# Patient Record
Sex: MALE | Race: WHITE | NOT HISPANIC OR LATINO | Employment: FULL TIME | URBAN - METROPOLITAN AREA
[De-identification: names, ages, dates, MRNs, and addresses within clinical notes are randomized per-mention and may not be internally consistent; named-entity substitution may affect disease eponyms.]

---

## 2019-11-30 ENCOUNTER — HOSPITAL ENCOUNTER (EMERGENCY)
Facility: HOSPITAL | Age: 39
Discharge: HOME | End: 2019-11-30
Attending: EMERGENCY MEDICINE

## 2019-11-30 VITALS
TEMPERATURE: 98 F | OXYGEN SATURATION: 99 % | RESPIRATION RATE: 20 BRPM | DIASTOLIC BLOOD PRESSURE: 73 MMHG | HEIGHT: 70 IN | HEART RATE: 106 BPM | SYSTOLIC BLOOD PRESSURE: 148 MMHG | WEIGHT: 224 LBS | BODY MASS INDEX: 32.07 KG/M2

## 2019-11-30 DIAGNOSIS — K92.0 HEMATEMESIS, PRESENCE OF NAUSEA NOT SPECIFIED: ICD-10-CM

## 2019-11-30 DIAGNOSIS — R51.9 NONINTRACTABLE HEADACHE, UNSPECIFIED CHRONICITY PATTERN, UNSPECIFIED HEADACHE TYPE: Primary | ICD-10-CM

## 2019-11-30 PROCEDURE — 99284 EMERGENCY DEPT VISIT MOD MDM: CPT | Mod: 25

## 2019-11-30 PROCEDURE — 63600175 PHARM REV CODE 636 W HCPCS: Performed by: EMERGENCY MEDICINE

## 2019-11-30 PROCEDURE — 96365 THER/PROPH/DIAG IV INF INIT: CPT

## 2019-11-30 PROCEDURE — 96375 TX/PRO/DX INJ NEW DRUG ADDON: CPT

## 2019-11-30 RX ORDER — ONDANSETRON 4 MG/1
4 TABLET, ORALLY DISINTEGRATING ORAL EVERY 8 HOURS PRN
Qty: 12 TABLET | Refills: 0 | Status: SHIPPED | OUTPATIENT
Start: 2019-11-30

## 2019-11-30 RX ORDER — DIPHENHYDRAMINE HYDROCHLORIDE 50 MG/ML
12.5 INJECTION INTRAMUSCULAR; INTRAVENOUS
Status: COMPLETED | OUTPATIENT
Start: 2019-11-30 | End: 2019-11-30

## 2019-11-30 RX ADMIN — PROMETHAZINE HYDROCHLORIDE 25 MG: 25 INJECTION INTRAMUSCULAR; INTRAVENOUS at 10:11

## 2019-11-30 RX ADMIN — DIPHENHYDRAMINE HYDROCHLORIDE 12.5 MG: 50 INJECTION INTRAMUSCULAR; INTRAVENOUS at 10:11

## 2019-12-01 NOTE — ED PROVIDER NOTES
Encounter Date: 11/30/2019    SCRIBE #1 NOTE: I, Harry Perez, am scribing for, and in the presence of, Dany Mcgregor MD.       History     Chief Complaint   Patient presents with    Headache     Vomiting started this PM,        Time seen by provider: 10:06 PM on 11/30/2019    Jeramie Hamilton is a 38 y.o. male who presents to the ED with an onset of nausea with a single episode of vomiting with small amount dark blood occurring PTA. This is preceded by 16 hrs of intermittent worsening left upper forehead HA and pressure/pain to the left eye that is unchanged with Ibuprofen for 16 hrs. He states that he awoke with this pain and it does not affect his vision. This pain also worsens with light. Pt  The blood in his vomit was approximately the size of a nickel and occurred only wants. He was taking Atenolol and Methimazole but since he moved from [place] he has not been on medication.  Smokes half pack a day.  PMHx of HTN and to heart attacks. No PSHx of cardiac stent placement. SHx of smoking 0.5 PPD, current. FHx of early dead by cardiac arrest, grandfather at 42. Known reaction to sulfa.  Patient has a long history of similar headaches.  This headache is not different in any way.    The history is provided by the patient.     Review of patient's allergies indicates:   Allergen Reactions    Sulfa (sulfonamide antibiotics)      No past medical history on file.  No past surgical history on file.  No family history on file.  Social History     Tobacco Use    Smoking status: Not on file   Substance Use Topics    Alcohol use: Not on file    Drug use: Not on file     Review of Systems   Constitutional: Negative for fever.   HENT: Negative for sore throat.    Eyes: Positive for photophobia and pain. Negative for visual disturbance.   Respiratory: Negative for shortness of breath.    Cardiovascular: Negative for chest pain.   Gastrointestinal: Positive for nausea and vomiting. Negative for abdominal pain and diarrhea.    Musculoskeletal: Negative for back pain.   Skin: Negative for rash.   Neurological: Negative for dizziness, speech difficulty, weakness and numbness.       Physical Exam     Initial Vitals [11/30/19 2031]   BP Pulse Resp Temp SpO2   (!) 154/80 (!) 122 20 98.1 °F (36.7 °C) 97 %      MAP       --         Physical Exam    Nursing note and vitals reviewed.  Constitutional: He appears well-developed and well-nourished. He is not diaphoretic.  Non-toxic appearance. He does not have a sickly appearance. He does not appear ill. No distress.   No distress well-appearing   HENT:   Head: Normocephalic and atraumatic.   Eyes: EOM are normal. Pupils are equal, round, and reactive to light. Right eye exhibits no nystagmus. Left eye exhibits no nystagmus.   Positive mild photophobia on examination.   Neck: Normal range of motion. Neck supple. Normal range of motion present. No neck rigidity.   Cardiovascular: Normal rate, regular rhythm and normal heart sounds. Exam reveals no gallop and no friction rub.    No murmur heard.  Pulmonary/Chest: Breath sounds normal. No respiratory distress. He has no wheezes. He has no rhonchi. He has no rales.   Abdominal: Normal appearance. There is no tenderness.   Musculoskeletal: Normal range of motion.   Neurological: He is alert and oriented to person, place, and time. He has normal strength. No cranial nerve deficit or sensory deficit. He exhibits normal muscle tone. Coordination and gait normal.    Normal finger to nose. No nystagmus. No truncal ataxia. No past pointing. Normal gait.     Skin: Skin is warm and dry. No rash noted.   Psychiatric: He has a normal mood and affect. His behavior is normal. Judgment and thought content normal.         ED Course   Procedures  Labs Reviewed - No data to display       Imaging Results    None          Medical Decision Making:   History:   Old Medical Records: I decided to obtain old medical records.            Scribe Attestation:   Scribe #1: I  performed the above scribed service and the documentation accurately describes the services I performed. I attest to the accuracy of the note.    I, Dr. Mcgregor, personally performed the services described in this documentation. All medical record entries made by the scribe were at my direction and in my presence.  I have reviewed the chart and agree that the record reflects my personal performance and is accurate and complete.12:25 AM 12/01/2019            ED Course as of Nov 30 2332   Sat Nov 30, 2019 2159 BP(!): 154/80 [EF]   2159 Temp: 98.1 °F (36.7 °C) [EF]   2159 Temp src: Oral [EF]   2159 Pulse(!): 122 [EF]   2159 Resp: 20 [EF]   2159 SpO2: 97 % [EF]   2331 Headache and nausea now resolved patient will be discharged home    [EF]      ED Course User Index  [EF] Dany Mcgregor MD                Clinical Impression:       ICD-10-CM ICD-9-CM   1. Nonintractable headache, unspecified chronicity pattern, unspecified headache type R51 784.0   2. Hematemesis, presence of nausea not specified K92.0 578.0         Disposition:   Disposition: Discharged  Condition: Stable        38-year-old with a long history of headaches presents with a headache since this morning consistent with all prior headaches.  Sounds convincing for migraine left-sided similar to prior associated with nausea and vomiting.  Patient noticed a nickel sized blood clot at 1 point after vomiting. This occurred only 1 time.  He might have a very minimal esophageal tear.  I do not think the patient needs to be admitted for monitoring or GI consultation or for an emergent EGD.  IV Phenergan and Benadryl here in the emergency room his completely eliminated his headache and his nausea.  He is referred to headache specialist.  Return to the ER for any further hematemesis.  No indication for head CT or lumbar puncture.             Dany Mcgregor MD  12/01/19 0026

## 2020-03-13 ENCOUNTER — HOSPITAL ENCOUNTER (EMERGENCY)
Facility: HOSPITAL | Age: 40
Discharge: HOME | End: 2020-03-13
Attending: EMERGENCY MEDICINE

## 2020-03-13 VITALS
WEIGHT: 225 LBS | BODY MASS INDEX: 32.28 KG/M2 | RESPIRATION RATE: 16 BRPM | SYSTOLIC BLOOD PRESSURE: 159 MMHG | OXYGEN SATURATION: 99 % | DIASTOLIC BLOOD PRESSURE: 87 MMHG | TEMPERATURE: 100 F | HEART RATE: 95 BPM

## 2020-03-13 DIAGNOSIS — B34.9 VIRAL SYNDROME: Primary | ICD-10-CM

## 2020-03-13 LAB
INFLUENZA A, MOLECULAR: NEGATIVE
INFLUENZA B, MOLECULAR: NEGATIVE
SPECIMEN SOURCE: NORMAL

## 2020-03-13 PROCEDURE — 99283 EMERGENCY DEPT VISIT LOW MDM: CPT

## 2020-03-13 PROCEDURE — 25000003 PHARM REV CODE 250: Performed by: EMERGENCY MEDICINE

## 2020-03-13 PROCEDURE — 87502 INFLUENZA DNA AMP PROBE: CPT

## 2020-03-13 RX ORDER — ACETAMINOPHEN 325 MG/1
650 TABLET ORAL
Status: COMPLETED | OUTPATIENT
Start: 2020-03-13 | End: 2020-03-13

## 2020-03-13 RX ADMIN — ACETAMINOPHEN 650 MG: 325 TABLET ORAL at 10:03

## 2020-03-13 NOTE — ED PROVIDER NOTES
Encounter Date: 3/13/2020    SCRIBE #1 NOTE: I, Day Alfaro, am scribing for, and in the presence of, Dr. Curtis.       History     Chief Complaint   Patient presents with    Generalized Body Aches    Emesis       Time seen by provider: 10:01 AM on 03/13/2020    Jeramie Hamilton is a 39 y.o. male who presents to the ED with c/o nausea and vomiting since yesterday. He states his last episode of vomiting was 5 hours ago. He states he has been able to tolerate liquids and food since. He notes associated low grade fever and body aches. No abdominal pain, runny nose, CP or SOB. He has no other complaints. No PMHx or PSHx. NKDA.     The history is provided by the patient.     Review of patient's allergies indicates:   Allergen Reactions    Sulfa (sulfonamide antibiotics)      No past medical history on file.  No past surgical history on file.  No family history on file.  Social History     Tobacco Use    Smoking status: Not on file   Substance Use Topics    Alcohol use: Not on file    Drug use: Not on file     Review of Systems   Constitutional: Positive for fever (subjective).   HENT: Negative for rhinorrhea.    Respiratory: Negative for shortness of breath.    Cardiovascular: Negative for chest pain.   Gastrointestinal: Positive for nausea and vomiting. Negative for abdominal pain.   Genitourinary: Negative for flank pain.   Musculoskeletal: Positive for myalgias (generalized). Negative for gait problem.   Neurological: Negative for weakness.   Psychiatric/Behavioral: Negative for confusion.       Physical Exam     Initial Vitals [03/13/20 0946]   BP Pulse Resp Temp SpO2   (!) 177/103 (!) 129 18 99.9 °F (37.7 °C) 98 %      MAP       --         Physical Exam    Nursing note and vitals reviewed.  Constitutional: He appears well-developed and well-nourished.   HENT:   Head: Normocephalic and atraumatic.   Eyes: Conjunctivae are normal.   Neck: Normal range of motion. Neck supple.   Cardiovascular: Regular rhythm and  normal heart sounds. Tachycardia present.  Exam reveals no gallop and no friction rub.    No murmur heard.  Pulmonary/Chest: Breath sounds normal. No respiratory distress. He has no wheezes. He has no rhonchi. He has no rales.   Abdominal: Soft. He exhibits no distension. There is no tenderness.   Musculoskeletal: Normal range of motion.   Neurological: He is alert and oriented to person, place, and time.   Skin: Skin is warm and dry.   Psychiatric: He has a normal mood and affect.         ED Course   Procedures  Labs Reviewed   INFLUENZA A & B BY MOLECULAR          Imaging Results    None          Medical Decision Making:   History:   Old Medical Records: I decided to obtain old medical records.  Clinical Tests:   Lab Tests: Ordered and Reviewed  ED Management:  39-year-old male presents with myalgias and a single episode of emesis.  He has had no fever cough or congestion.  The symptoms are concerning for possible Covid 19 in the context of a pandemic; however, current guidelines do not recommend testing.  Influenza is negative.       APC / Resident Notes:   I, Dr. Sadiq Curtis III, personally performed the services described in this documentation. All medical record entries made by the scribe were at my direction and in my presence.  I have reviewed the chart and agree that the record reflects my personal performance and is accurate and complete       Scribe Attestation:   Scribe #1: I performed the above scribed service and the documentation accurately describes the services I performed. I attest to the accuracy of the note.                          Clinical Impression:       ICD-10-CM ICD-9-CM   1. Viral syndrome B34.9 079.99         Disposition:   Disposition: Discharged  Condition: Stable                        Sadiq Curtis III, MD  03/13/20 1306

## 2020-04-22 DIAGNOSIS — Z86.39 PERSONAL HISTORY OF OTHER ENDOCRINE, NUTRITIONAL AND METABOLIC DISEASE: Primary | ICD-10-CM

## 2020-07-25 ENCOUNTER — HOSPITAL ENCOUNTER (EMERGENCY)
Facility: HOSPITAL | Age: 40
Discharge: HOME | End: 2020-07-26
Attending: EMERGENCY MEDICINE

## 2020-07-25 VITALS
HEART RATE: 95 BPM | RESPIRATION RATE: 24 BRPM | WEIGHT: 209 LBS | OXYGEN SATURATION: 99 % | TEMPERATURE: 98 F | DIASTOLIC BLOOD PRESSURE: 77 MMHG | HEIGHT: 70 IN | SYSTOLIC BLOOD PRESSURE: 152 MMHG | BODY MASS INDEX: 29.92 KG/M2

## 2020-07-25 DIAGNOSIS — N20.1 URETEROLITHIASIS: Primary | ICD-10-CM

## 2020-07-25 DIAGNOSIS — N20.0 KIDNEY STONE: ICD-10-CM

## 2020-07-25 DIAGNOSIS — R10.9 ABDOMINAL PAIN: ICD-10-CM

## 2020-07-25 LAB
ALBUMIN SERPL BCP-MCNC: 4 G/DL (ref 3.5–5.2)
ALP SERPL-CCNC: 378 U/L (ref 55–135)
ALT SERPL W/O P-5'-P-CCNC: 26 U/L (ref 10–44)
ANION GAP SERPL CALC-SCNC: 12 MMOL/L (ref 8–16)
APTT BLDCRRT: 29.4 SEC (ref 21–32)
AST SERPL-CCNC: 20 U/L (ref 10–40)
BACTERIA #/AREA URNS HPF: ABNORMAL /HPF
BASOPHILS # BLD AUTO: 0.03 K/UL (ref 0–0.2)
BASOPHILS NFR BLD: 0.3 % (ref 0–1.9)
BILIRUB SERPL-MCNC: 0.4 MG/DL (ref 0.1–1)
BILIRUB UR QL STRIP: ABNORMAL
BUN SERPL-MCNC: 15 MG/DL (ref 6–20)
CALCIUM SERPL-MCNC: 9.2 MG/DL (ref 8.7–10.5)
CHLORIDE SERPL-SCNC: 107 MMOL/L (ref 95–110)
CLARITY UR: ABNORMAL
CO2 SERPL-SCNC: 21 MMOL/L (ref 23–29)
COLOR UR: ABNORMAL
CREAT SERPL-MCNC: 0.9 MG/DL (ref 0.5–1.4)
DIFFERENTIAL METHOD: ABNORMAL
EOSINOPHIL # BLD AUTO: 0.2 K/UL (ref 0–0.5)
EOSINOPHIL NFR BLD: 2.4 % (ref 0–8)
ERYTHROCYTE [DISTWIDTH] IN BLOOD BY AUTOMATED COUNT: 13.8 % (ref 11.5–14.5)
EST. GFR  (AFRICAN AMERICAN): >60 ML/MIN/1.73 M^2
EST. GFR  (NON AFRICAN AMERICAN): >60 ML/MIN/1.73 M^2
GLUCOSE SERPL-MCNC: 108 MG/DL (ref 70–110)
GLUCOSE UR QL STRIP: NEGATIVE
HCT VFR BLD AUTO: 43.4 % (ref 40–54)
HGB BLD-MCNC: 13.9 G/DL (ref 14–18)
HGB UR QL STRIP: ABNORMAL
HYALINE CASTS #/AREA URNS LPF: 0 /LPF
IMM GRANULOCYTES # BLD AUTO: 0.02 K/UL (ref 0–0.04)
IMM GRANULOCYTES NFR BLD AUTO: 0.2 % (ref 0–0.5)
INR PPP: 0.9 (ref 0.8–1.2)
KETONES UR QL STRIP: NEGATIVE
LEUKOCYTE ESTERASE UR QL STRIP: NEGATIVE
LIPASE SERPL-CCNC: 22 U/L (ref 4–60)
LYMPHOCYTES # BLD AUTO: 4.2 K/UL (ref 1–4.8)
LYMPHOCYTES NFR BLD: 42.1 % (ref 18–48)
MCH RBC QN AUTO: 26.8 PG (ref 27–31)
MCHC RBC AUTO-ENTMCNC: 32 G/DL (ref 32–36)
MCV RBC AUTO: 84 FL (ref 82–98)
MICROSCOPIC COMMENT: ABNORMAL
MONOCYTES # BLD AUTO: 1.1 K/UL (ref 0.3–1)
MONOCYTES NFR BLD: 11.2 % (ref 4–15)
NEUTROPHILS # BLD AUTO: 4.4 K/UL (ref 1.8–7.7)
NEUTROPHILS NFR BLD: 43.8 % (ref 38–73)
NITRITE UR QL STRIP: NEGATIVE
NRBC BLD-RTO: 0 /100 WBC
PH UR STRIP: 6 [PH] (ref 5–8)
PLATELET # BLD AUTO: 238 K/UL (ref 150–350)
PMV BLD AUTO: 9.5 FL (ref 9.2–12.9)
POTASSIUM SERPL-SCNC: 3.8 MMOL/L (ref 3.5–5.1)
PROT SERPL-MCNC: 7.2 G/DL (ref 6–8.4)
PROT UR QL STRIP: ABNORMAL
PROTHROMBIN TIME: 10.5 SEC (ref 9–12.5)
RBC # BLD AUTO: 5.19 M/UL (ref 4.6–6.2)
RBC #/AREA URNS HPF: 75 /HPF (ref 0–4)
SODIUM SERPL-SCNC: 140 MMOL/L (ref 136–145)
SP GR UR STRIP: >=1.03 (ref 1–1.03)
TROPONIN I SERPL DL<=0.01 NG/ML-MCNC: 0.01 NG/ML (ref 0–0.03)
URN SPEC COLLECT METH UR: ABNORMAL
UROBILINOGEN UR STRIP-ACNC: 1 EU/DL
WBC # BLD AUTO: 9.94 K/UL (ref 3.9–12.7)
WBC #/AREA URNS HPF: 1 /HPF (ref 0–5)

## 2020-07-25 PROCEDURE — 83690 ASSAY OF LIPASE: CPT

## 2020-07-25 PROCEDURE — 25000003 PHARM REV CODE 250: Performed by: EMERGENCY MEDICINE

## 2020-07-25 PROCEDURE — 63600175 PHARM REV CODE 636 W HCPCS: Performed by: EMERGENCY MEDICINE

## 2020-07-25 PROCEDURE — 85730 THROMBOPLASTIN TIME PARTIAL: CPT

## 2020-07-25 PROCEDURE — 93005 ELECTROCARDIOGRAM TRACING: CPT

## 2020-07-25 PROCEDURE — 96375 TX/PRO/DX INJ NEW DRUG ADDON: CPT

## 2020-07-25 PROCEDURE — 85610 PROTHROMBIN TIME: CPT

## 2020-07-25 PROCEDURE — 80053 COMPREHEN METABOLIC PANEL: CPT

## 2020-07-25 PROCEDURE — 85025 COMPLETE CBC W/AUTO DIFF WBC: CPT

## 2020-07-25 PROCEDURE — 36415 COLL VENOUS BLD VENIPUNCTURE: CPT

## 2020-07-25 PROCEDURE — 96374 THER/PROPH/DIAG INJ IV PUSH: CPT

## 2020-07-25 PROCEDURE — 81000 URINALYSIS NONAUTO W/SCOPE: CPT

## 2020-07-25 PROCEDURE — 99285 EMERGENCY DEPT VISIT HI MDM: CPT | Mod: 25

## 2020-07-25 PROCEDURE — 84484 ASSAY OF TROPONIN QUANT: CPT

## 2020-07-25 PROCEDURE — 25500020 PHARM REV CODE 255

## 2020-07-25 RX ORDER — ONDANSETRON 2 MG/ML
8 INJECTION INTRAMUSCULAR; INTRAVENOUS
Status: COMPLETED | OUTPATIENT
Start: 2020-07-25 | End: 2020-07-25

## 2020-07-25 RX ORDER — METHIMAZOLE 10 MG/1
5 TABLET ORAL 2 TIMES DAILY
COMMUNITY

## 2020-07-25 RX ORDER — PROPRANOLOL HYDROCHLORIDE 20 MG/1
20 TABLET ORAL 3 TIMES DAILY
COMMUNITY

## 2020-07-25 RX ORDER — TAMSULOSIN HYDROCHLORIDE 0.4 MG/1
0.4 CAPSULE ORAL DAILY
Qty: 10 CAPSULE | Refills: 0 | Status: SHIPPED | OUTPATIENT
Start: 2020-07-25 | End: 2020-08-04

## 2020-07-25 RX ORDER — ONDANSETRON 4 MG/1
4 TABLET, FILM COATED ORAL EVERY 6 HOURS
Qty: 12 TABLET | Refills: 0 | Status: SHIPPED | OUTPATIENT
Start: 2020-07-25

## 2020-07-25 RX ORDER — ASPIRIN 81 MG/1
81 TABLET ORAL DAILY
COMMUNITY

## 2020-07-25 RX ORDER — HYDROCODONE BITARTRATE AND ACETAMINOPHEN 5; 325 MG/1; MG/1
1 TABLET ORAL EVERY 4 HOURS PRN
Qty: 10 TABLET | Refills: 0 | Status: SHIPPED | OUTPATIENT
Start: 2020-07-25

## 2020-07-25 RX ORDER — KETOROLAC TROMETHAMINE 30 MG/ML
15 INJECTION, SOLUTION INTRAMUSCULAR; INTRAVENOUS
Status: COMPLETED | OUTPATIENT
Start: 2020-07-25 | End: 2020-07-25

## 2020-07-25 RX ORDER — MORPHINE SULFATE 2 MG/ML
6 INJECTION, SOLUTION INTRAMUSCULAR; INTRAVENOUS
Status: COMPLETED | OUTPATIENT
Start: 2020-07-25 | End: 2020-07-25

## 2020-07-25 RX ADMIN — SODIUM CHLORIDE 1000 ML: 0.9 INJECTION, SOLUTION INTRAVENOUS at 09:07

## 2020-07-25 RX ADMIN — ONDANSETRON 8 MG: 2 INJECTION INTRAMUSCULAR; INTRAVENOUS at 09:07

## 2020-07-25 RX ADMIN — MORPHINE SULFATE 6 MG: 2 INJECTION, SOLUTION INTRAMUSCULAR; INTRAVENOUS at 09:07

## 2020-07-25 RX ADMIN — KETOROLAC TROMETHAMINE 15 MG: 30 INJECTION, SOLUTION INTRAMUSCULAR at 11:07

## 2020-07-25 RX ADMIN — IOHEXOL 100 ML: 350 INJECTION, SOLUTION INTRAVENOUS at 10:07

## 2020-07-26 NOTE — ED NOTES
Patient reports that he is still having pain to right lower abdomen that he rates 6/10. Patient appears to be more comfortable and is no longer diaphoretic.

## 2020-07-26 NOTE — ED NOTES
"Jeramie Hamilton presents to the ED with c/o right lower back pain that started 2 hours ago and is radiating to right lower abdomen. Patient reports that pain feels as if he has a kidney stone. Patient had a non ST elevated MI one week ago. Patient is severely diaphoretic. + for nausea. Denies any vomiting or diarrhea. "I did see some blood in my urine either." Patient gave approximately 20 ml of medium colored brown urine. AAOx4. Patient appears to be very uncomfortable. Patient states that sitting up makes pain better. Patient is guarding right lower abdomen. Mucous membranes are pink and moist. Skin is warm, dry and intact. Lungs are clear bilaterally, respirations are regular and unlabored. Denies SOB, cough, congestion or rhinorrhea. BS active x4, tenderness to RLQ  with palpation, abd is soft and not distended. Denies any appetite or activity change. S1S2, capillary refill is < 2 seconds. Denies dysuria, difficulty urinating, frequency, numbness, tingling or weakness. MILDRED MARRUFOS    "

## 2020-07-26 NOTE — ED PROVIDER NOTES
Encounter Date: 7/25/2020    SCRIBE #1 NOTE: I, Jose Diaz, am scribing for, and in the presence of, Edouard Amezcua MD.       History     Chief Complaint   Patient presents with    Abdominal Pain     Started as lower back pain, but now could be kidney stones; radiated to RLQ ABD pain       Time seen by provider: 9:51 PM on 07/25/2020    Jeramie Hamilton is a 39 y.o. male with PMHx of NSTEMI, HTN, hyperthyroidism who presents to the ED with an onset of right lower abdominal pain beginning today. Associated symptoms include sweating and hematuria. Pain began in R flank now in RLQ. The patient endorses having a kidney stone last year. The patient denies fever, chest pain, SOB, chills, diarrhea, constipation, dysuria, or any other symptoms at this time. No PSHx.      The history is provided by the patient.     Review of patient's allergies indicates:   Allergen Reactions    Sulfa (sulfonamide antibiotics)      Past Medical History:   Diagnosis Date    HTN (hypertension)     Hyperthyroidism     NSTEMI (non-ST elevated myocardial infarction)      History reviewed. No pertinent surgical history.  History reviewed. No pertinent family history.  Social History     Tobacco Use    Smoking status: Current Every Day Smoker     Packs/day: 0.50     Years: 15.00     Pack years: 7.50     Types: Cigarettes    Smokeless tobacco: Never Used   Substance Use Topics    Alcohol use: Not on file    Drug use: Not on file     Review of Systems   Constitutional: Negative for activity change, diaphoresis and fever.   HENT: Negative for drooling, rhinorrhea, sore throat and trouble swallowing.    Eyes: Negative for pain and visual disturbance.   Respiratory: Negative for cough, shortness of breath and stridor.    Cardiovascular: Negative for chest pain and leg swelling.   Gastrointestinal: Positive for abdominal pain. Negative for abdominal distention, constipation, diarrhea and vomiting.   Genitourinary: Positive for hematuria. Negative  for discharge and dysuria.   Musculoskeletal: Negative for gait problem.   Skin: Negative for rash.   Neurological: Negative for seizures, facial asymmetry and headaches.   Psychiatric/Behavioral: Negative for hallucinations and suicidal ideas.       Physical Exam     Initial Vitals   BP Pulse Resp Temp SpO2   07/25/20 2131 07/25/20 2130 07/25/20 2130 07/25/20 2130 07/25/20 2130   (!) 177/101 82 (!) 22 98.2 °F (36.8 °C) 100 %      MAP       --                Physical Exam    Nursing note and vitals reviewed.  Constitutional: He appears well-developed. No distress.   Appears uncomfortable. Diaphoretic.    HENT:   Head: Normocephalic and atraumatic.   Nose: Nose normal.   Eyes: EOM are normal.   Neck: Neck supple. No tracheal deviation present. No JVD present.   Cardiovascular: Normal rate, regular rhythm, normal heart sounds and intact distal pulses. Exam reveals no gallop and no friction rub.    No murmur heard.  Pulmonary/Chest: Breath sounds normal. No respiratory distress. He has no wheezes. He has no rhonchi. He has no rales.   Abdominal: Soft. Bowel sounds are normal. There is abdominal tenderness in the right lower quadrant. There is guarding. There is no rigidity, no rebound and no CVA tenderness.   No CVA tenderness. RLQ tenderness. Voluntary guarding. No rigidity. No rebound.    Musculoskeletal: Normal range of motion.   Neurological: He is alert and oriented to person, place, and time. No cranial nerve deficit.   Skin: Skin is warm and dry. Capillary refill takes less than 2 seconds. No rash noted.   Psychiatric: He has a normal mood and affect.         ED Course   Procedures  Labs Reviewed   COMPREHENSIVE METABOLIC PANEL - Abnormal; Notable for the following components:       Result Value    CO2 21 (*)     Alkaline Phosphatase 378 (*)     All other components within normal limits   CBC W/ AUTO DIFFERENTIAL - Abnormal; Notable for the following components:    Hemoglobin 13.9 (*)     Mean Corpuscular  Hemoglobin 26.8 (*)     Mono # 1.1 (*)     All other components within normal limits   URINALYSIS - Abnormal; Notable for the following components:    Color, UA Brown (*)     Appearance, UA Hazy (*)     Specific Gravity, UA >=1.030 (*)     Protein, UA 2+ (*)     Bilirubin (UA) 2+ (*)     Occult Blood UA 3+ (*)     All other components within normal limits   URINALYSIS MICROSCOPIC - Abnormal; Notable for the following components:    RBC, UA 75 (*)     All other components within normal limits   TROPONIN I   APTT   PROTIME-INR   LIPASE     EKG Readings: (Independently Interpreted)   Initial Reading: No STEMI. Heart Rate: 78.   Sinus rhythm of 78 bpm with short IN. No STEMI.       Imaging Results           CT Abdomen Pelvis With Contrast (Final result)  Result time 07/25/20 22:55:02    Final result by Juan Miguel Chand MD (07/25/20 22:55:02)                 Impression:      Nonvisualization of the appendix.  No CT findings of acute appendicitis.    3 mm obstructing stone in the right proximal with associated mild right-sided hydroureteronephrosis.  No additional nephrolithiasis.    Hazy changes involving the small bowel mesentery with multiple subcentimeter lymph nodes.  The appearance suggestive of wai mesentery.  Follow-up to resolution is suggested to exclude underlying process such as lymphoma.    L4-L5 spondylolisthesis.    Additional findings as above    This report was flagged in Epic as abnormal.      Electronically signed by: Juan Miguel Chand MD  Date:    07/25/2020  Time:    22:55             Narrative:    EXAMINATION:  CT ABDOMEN PELVIS WITH CONTRAST    CLINICAL HISTORY:  Abdominal infection suspected;RLQ abdominal pain, appendicitis suspected (Age => 14y);    TECHNIQUE:  Low dose axial images, sagittal and coronal reformations were obtained from the lung bases to the pubic symphysis following the IV administration of 100 mL of Omnipaque 350 .  Oral contrast was not given.    COMPARISON:  None.    FINDINGS:  There  are no pleural effusions.  There is no evidence of a pneumothorax.  No airspace opacity is present.  No pulmonary nodule is identified.    The heart is unremarkable.  There is normal tapering of the abdominal aorta.  There are scattered calcifications involving the infrarenal abdominal aorta.  The celiac trunk, SMA, and GLORIA are within normal limits.  The portal veins and mesenteric vessels are unremarkable.  The IVC and the remainder of the venous structures are within normal limits.    There are innumerable subcentimeter lymph nodes throughout the small bowel mesentery.  There is hazy appearance of the small bowel mesentery.  There also subcentimeter bilateral retroperitoneal lymph nodes.    The esophagus, stomach, and duodenum are within normal limits.  The small bowel loops are unremarkable.  The appendix is not visualized.  There are no secondary findings of acute appendicitis.  There is colonic diverticula without evidence of acute diverticulitis.  There is no evidence of bowel obstruction.    The liver is unremarkable.  The gallbladder is within normal limits.  The biliary tree is within normal limits.  The spleen is unremarkable.  The pancreas is within normal limits.    The adrenal glands are unremarkable.  There is a 1.8 cm simple cyst in the upper pole of the right kidney.  The left kidney is unremarkable.  There is a 3 mm obstructing stone in the right proximal ureter.  There is mild right-sided hydroureteronephrosis.  The remainder of the ureters are unremarkable.  The urinary bladder is within normal limits.  The prostate gland is enlarged.  There are calcifications in the prostate gland.    There is no evidence of free fluid in the abdomen or pelvis.  There is no evidence of free air.  There is no evidence of pneumatosis.  No portal venous air is identified.    The psoas margins are unremarkable.  There are bilateral fat containing inguinal hernias.  The remainder of the abdominal wall is  unremarkable.    There is transitional lumbosacral anatomy with partial lumbarization of S1 vertebral body.  There is grade 1 anterolisthesis of L4 on L5.  There are bilateral pars defects at the L4 level.                                 Medical Decision Making:   History:   Old Medical Records: I decided to obtain old medical records.  Independently Interpreted Test(s):   I have ordered and independently interpreted EKG Reading(s) - see prior notes  Clinical Tests:   Lab Tests: Ordered and Reviewed  Radiological Study: Reviewed and Ordered  Medical Tests: Ordered and Reviewed  ED Management:  40 yo M pmhx of kidney stones presents with flank pain consistent with renal colic for the first time.  Patient otherwise well-appearing with low suspicion for sepsis, dissection or infected kidney stone. Low suspicion for atypical appendicitis, torsion, acute cholecystitis, or intraabdominal infection. Low suspicion for ITALO, obstructive nephropathy given exam and history.    Will obtain CT noncontrast, UA, reassess.    CT showed 3mm stone in the right proximal ureter. No evidence of infection on UA. Patient tolerating PO and pain controlled prior to discharge. Strict return precautions for infected stone or po intolerance discussed. Given strainer and flomax and referral to urology. Pt understands and agrees with discharge instructions. Pt also given strict return precautions for any new or worsening symptoms and plans to follow up closely with Urology.               Scribe Attestation:   Scribe #1: I performed the above scribed service and the documentation accurately describes the services I performed. I attest to the accuracy of the note.      Attending Attestation:     Physician Attestation for Scribe:    I, Dr. Edouard Amezcua, personally performed the services described in this documentation.   All medical record entries made by the scribe were at my direction and in my presence.   I have reviewed the chart and agree  that the record is accurate and complete.   Edouard Amezcua MD  11:56 PM 07/25/2020     DISCLAIMER: This note was prepared with [employer] voice recognition transcription software. Garbled syntax, mangled pronouns, and other bizarre constructions may be attributed to that software system.          ED Course as of Jul 25 2356   Sat Jul 25, 2020 2258 Impression:     Nonvisualization of the appendix.  No CT findings of acute appendicitis.     3 mm obstructing stone in the right proximal with associated mild right-sided hydroureteronephrosis.  No additional nephrolithiasis.     Hazy changes involving the small bowel mesentery with multiple subcentimeter lymph nodes.  The appearance suggestive of wai mesentery.  Follow-up to resolution is suggested to exclude underlying process such as lymphoma.     L4-L5 spondylolisthesis.     Additional findings as above     This report was flagged in Epic as abnormal.        Electronically signed by: Juan Miguel Chand MD  Date:                                            07/25/2020  Time:                                           22:55    [BD]      ED Course User Index  [BD] Edouard Amezcua MD                Clinical Impression:       ICD-10-CM ICD-9-CM   1. Ureterolithiasis  N20.1 592.1   2. Abdominal pain  R10.9 789.00   3. Kidney stone  N20.0 592.0         Disposition:   Disposition: Discharged  Condition: Stable     ED Disposition Condition    Discharge Stable        ED Prescriptions     Medication Sig Dispense Start Date End Date Auth. Provider    tamsulosin (FLOMAX) 0.4 mg Cap Take 1 capsule (0.4 mg total) by mouth once daily. for 10 days 10 capsule 7/25/2020 8/4/2020 Edouard Amezcua MD    ondansetron (ZOFRAN) 4 MG tablet Take 1 tablet (4 mg total) by mouth every 6 (six) hours. 12 tablet 7/25/2020  Edouard Amezcua MD    HYDROcodone-acetaminophen (NORCO) 5-325 mg per tablet Take 1 tablet by mouth every 4 (four) hours as needed for Pain. 10 tablet 7/25/2020  Edouard  MD Goldie        Follow-up Information     Follow up With Specialties Details Why Contact Info    Raghu Resendiz Jr., MD Urology Go in 3 days  [address] [place] [address] DR  [address]  [facility] [id number] [phone]      [facility] Emergency Medicine Go to  As needed, If symptoms worsen [address]  [facility] [address] [phone]                                     Edouard Amezcua MD  07/26/20 0617

## 2020-07-26 NOTE — ED TRIAGE NOTES
"Started as lower back pain roughly 2 hours ago but has radiated to RLQ pain. Pt states he "thinks this could be a kidney stones" and he "noticed some blood in his urine". Pt diaphoretic and states pain is a 10/10.  "

## 2021-04-08 ENCOUNTER — OCCUPATIONAL HEALTH (OUTPATIENT)
Dept: URGENT CARE | Facility: CLINIC | Age: 41
End: 2021-04-08

## 2021-04-08 PROCEDURE — 80305 PR COLLECTION ONLY DRUG SCREEN: ICD-10-PCS | Mod: S$GLB,,, | Performed by: EMERGENCY MEDICINE

## 2021-04-08 PROCEDURE — 80305 DRUG TEST PRSMV DIR OPT OBS: CPT | Mod: S$GLB,,, | Performed by: EMERGENCY MEDICINE

## 2021-08-16 ENCOUNTER — HOSPITAL ENCOUNTER (EMERGENCY)
Facility: HOSPITAL | Age: 41
Discharge: HOME | End: 2021-08-16
Attending: EMERGENCY MEDICINE
Payer: OTHER GOVERNMENT

## 2021-08-16 VITALS
HEIGHT: 70 IN | WEIGHT: 188 LBS | OXYGEN SATURATION: 99 % | RESPIRATION RATE: 18 BRPM | DIASTOLIC BLOOD PRESSURE: 89 MMHG | HEART RATE: 97 BPM | SYSTOLIC BLOOD PRESSURE: 148 MMHG | TEMPERATURE: 98 F | BODY MASS INDEX: 26.92 KG/M2

## 2021-08-16 DIAGNOSIS — Z20.822 CLOSE EXPOSURE TO COVID-19 VIRUS: Primary | ICD-10-CM

## 2021-08-16 LAB — SARS-COV-2 RDRP RESP QL NAA+PROBE: NEGATIVE

## 2021-08-16 PROCEDURE — U0002 COVID-19 LAB TEST NON-CDC: HCPCS | Performed by: EMERGENCY MEDICINE

## 2021-08-16 PROCEDURE — 99282 EMERGENCY DEPT VISIT SF MDM: CPT

## 2021-08-21 ENCOUNTER — TELEPHONE (OUTPATIENT)
Dept: FAMILY MEDICINE | Facility: CLINIC | Age: 41
End: 2021-08-21

## 2021-08-21 ENCOUNTER — CLINICAL SUPPORT (OUTPATIENT)
Dept: FAMILY MEDICINE | Facility: CLINIC | Age: 41
End: 2021-08-21
Payer: OTHER GOVERNMENT

## 2021-08-21 DIAGNOSIS — R05.9 COUGH: Primary | ICD-10-CM

## 2021-08-21 DIAGNOSIS — R51.9 NONINTRACTABLE HEADACHE, UNSPECIFIED CHRONICITY PATTERN, UNSPECIFIED HEADACHE TYPE: ICD-10-CM

## 2021-08-21 LAB — SARS-COV-2 RDRP RESP QL NAA+PROBE: POSITIVE

## 2021-08-21 PROCEDURE — U0002 COVID-19 LAB TEST NON-CDC: HCPCS | Performed by: FAMILY MEDICINE
